# Patient Record
Sex: FEMALE | Race: WHITE | NOT HISPANIC OR LATINO | Employment: FULL TIME | URBAN - METROPOLITAN AREA
[De-identification: names, ages, dates, MRNs, and addresses within clinical notes are randomized per-mention and may not be internally consistent; named-entity substitution may affect disease eponyms.]

---

## 2019-01-24 ENCOUNTER — OFFICE VISIT (OUTPATIENT)
Dept: FAMILY MEDICINE CLINIC | Facility: CLINIC | Age: 59
End: 2019-01-24

## 2019-01-24 VITALS — HEART RATE: 86 BPM | TEMPERATURE: 98.2 F | WEIGHT: 147 LBS | OXYGEN SATURATION: 98 %

## 2019-01-24 DIAGNOSIS — R68.89 FLU-LIKE SYMPTOMS: Primary | ICD-10-CM

## 2019-01-24 PROCEDURE — 99202 OFFICE O/P NEW SF 15 MIN: CPT | Performed by: NURSE PRACTITIONER

## 2019-01-24 RX ORDER — BUPROPION HYDROCHLORIDE 300 MG/1
TABLET ORAL
COMMUNITY
Start: 2018-03-11

## 2019-01-24 NOTE — PROGRESS NOTES
Abbey Gan is a 58 y.o. female.Abbey states that 4 days ago she developed fatigue and body aches. Tuesday she developed a fever, she did not check it.  She is feeling better today.       Assessment/Plan   Problem List Items Addressed This Visit     None      Visit Diagnoses     Flu-like symptoms    -  Primary             No Follow-up on file.  There are no Patient Instructions on file for this visit.    Chief Complaint   Patient presents with   • Fever     Social History     Tobacco Use   • Smoking status: Never Smoker   • Smokeless tobacco: Never Used   Substance Use Topics   • Alcohol use: Not on file     Comment: 5 drinks a week   • Drug use: No       History of Present Illness     The following portions of the patient's history were reviewed and updated as appropriate:PMHroutine: Social history , Allergies, Current Medications, Active Problem List and Health Maintenance    Review of Systems   Constitutional: Positive for fever.   HENT: Positive for congestion. Negative for ear pain.    Respiratory: Positive for cough. Negative for shortness of breath.    Musculoskeletal: Positive for myalgias.       Objective   Vitals:    01/24/19 1312   Pulse: 86   Temp: 98.2 °F (36.8 °C)   SpO2: 98%   Weight: 66.7 kg (147 lb)     There is no height or weight on file to calculate BMI.  Physical Exam  Reviewed Data:  No visits with results within 1 Month(s) from this visit.   Latest known visit with results is:   No results found for any previous visit.